# Patient Record
Sex: MALE | Race: WHITE | NOT HISPANIC OR LATINO | Employment: FULL TIME | URBAN - METROPOLITAN AREA
[De-identification: names, ages, dates, MRNs, and addresses within clinical notes are randomized per-mention and may not be internally consistent; named-entity substitution may affect disease eponyms.]

---

## 2024-10-15 ENCOUNTER — OFFICE VISIT (OUTPATIENT)
Dept: INTERNAL MEDICINE | Age: 49
End: 2024-10-15
Payer: COMMERCIAL

## 2024-10-15 VITALS
OXYGEN SATURATION: 99 % | BODY MASS INDEX: 28.11 KG/M2 | HEIGHT: 78 IN | WEIGHT: 243 LBS | TEMPERATURE: 98.4 F | HEART RATE: 78 BPM | DIASTOLIC BLOOD PRESSURE: 78 MMHG | SYSTOLIC BLOOD PRESSURE: 102 MMHG

## 2024-10-15 DIAGNOSIS — Z12.5 SCREENING FOR PROSTATE CANCER: ICD-10-CM

## 2024-10-15 DIAGNOSIS — E78.00 PURE HYPERCHOLESTEROLEMIA: ICD-10-CM

## 2024-10-15 DIAGNOSIS — R73.03 PREDIABETES: ICD-10-CM

## 2024-10-15 DIAGNOSIS — F84.5: ICD-10-CM

## 2024-10-15 DIAGNOSIS — F32.9 REACTIVE DEPRESSION: ICD-10-CM

## 2024-10-15 DIAGNOSIS — Z23 NEED FOR VACCINATION: ICD-10-CM

## 2024-10-15 DIAGNOSIS — N52.9 ERECTILE DISORDER: Primary | ICD-10-CM

## 2024-10-15 LAB — PSA SERPL-MCNC: 1.28 NG/ML (ref 0–4)

## 2024-10-15 PROCEDURE — 84402 ASSAY OF FREE TESTOSTERONE: CPT | Performed by: INTERNAL MEDICINE

## 2024-10-15 PROCEDURE — 90471 IMMUNIZATION ADMIN: CPT | Performed by: INTERNAL MEDICINE

## 2024-10-15 PROCEDURE — 99204 OFFICE O/P NEW MOD 45 MIN: CPT | Performed by: INTERNAL MEDICINE

## 2024-10-15 PROCEDURE — G0103 PSA SCREENING: HCPCS | Performed by: INTERNAL MEDICINE

## 2024-10-15 PROCEDURE — 84403 ASSAY OF TOTAL TESTOSTERONE: CPT | Performed by: INTERNAL MEDICINE

## 2024-10-15 PROCEDURE — 90715 TDAP VACCINE 7 YRS/> IM: CPT | Performed by: INTERNAL MEDICINE

## 2024-10-15 RX ORDER — SIMVASTATIN 40 MG
40 TABLET ORAL NIGHTLY
COMMUNITY

## 2024-10-15 RX ORDER — TADALAFIL 10 MG/1
TABLET ORAL
Qty: 30 TABLET | Refills: 0 | Status: SHIPPED | OUTPATIENT
Start: 2024-10-15

## 2024-10-15 RX ORDER — ESCITALOPRAM OXALATE 20 MG/1
20 TABLET ORAL DAILY
COMMUNITY

## 2024-10-15 NOTE — PROGRESS NOTES
CHIEF COMPLAINT  Mark Benitez Jr. presents to [facility] MEDIC[facility] to Establish Care (Patient just moved here and needing a PCP.), patient is having problems mantaining a  (Erection and would like to see about getting medication for this like cialias), patient would like to see about winging off of lexapro  (And is there any sexual side effects from it.), and Hyperlipidemia     HPI  49-year-old patient here to establish care-just moved from [place], [place]-     Pt just got  1 1/2 weeks ago  Main concern is ED    Reviewed labs from previous PCP-Labs drawn 7/24/2024 with total cholesterol 160, LDL 90 HDL 47  ( vitamin D 12 in 7/25/2023), TSH equals 2.37 and normal on 7/20/2024, MND equals 35 on 7/24/2020, 12 equals 530 on 7/20/2024, comp with normal liver function test creatinine equals 1.2 glucose 96 ALT equals 24 potassium 4.3 total bilirubin 0.5 alk phos 75 UN 23 3141, A1c equals 6 on 7/20/2024, WBC 5.5 hemoglobin 14 lites 223 hematocrit 43 on 7/20/2024      PMH-ED, depression-on lexapro for 21 yrs, h/o OCD/anxiety, cholesterol, Aspergers  SH- Had lived with elderly parents-works in [employer] and a  in [employer] daily- [employer] steps-  -no cigs,ETOH or drugs    ROS -no cp-no soa, anxious, no HI no SI, decreased erection    Past History:  Allergies: Patient has no known allergies.   Medical History: has a past medical history of Anxiety and Depression.   Surgical History: has no past surgical history on file.   Family History: family history includes COPD in his father; Heart disease in his paternal grandmother.   Social History: reports that he has never smoked. He has never used smokeless tobacco. He reports that he does not drink alcohol and does not use drugs.  Social History     Social History Narrative    Not on file       Current Outpatient Medications:     escitalopram (LEXAPRO) 20 MG tablet, Take 1 tablet by mouth Daily., Disp: , Rfl:  "    simvastatin (ZOCOR) 40 MG tablet, Take 1 tablet by mouth Every Night., Disp: , Rfl:     tadalafil (Cialis) 10 MG tablet, One tab po before activity -can increase to 2 tabs po every 36  hrs as needed, Disp: 30 tablet, Rfl: 0     OBJECTIVE  Vital Signs  Vitals:    10/15/24 0818   BP: 102/78   BP Location: Left arm   Patient Position: Sitting   Cuff Size: Adult   Pulse: 78   Temp: 98.4 °F (36.9 °C)   SpO2: 99%   Weight: 110 kg (243 lb)   Height: 198.1 cm (78\")      Body mass index is 28.08 kg/m².      Physical Exam  Vitals and nursing note reviewed.   Constitutional:       Appearance: Normal appearance.   HENT:      Head: Normocephalic and atraumatic.      Nose: Nose normal.   Eyes:      Extraocular Movements: Extraocular movements intact.      Pupils: Pupils are equal, round, and reactive to light.   Cardiovascular:      Rate and Rhythm: Normal rate and regular rhythm.   Pulmonary:      Effort: Pulmonary effort is normal.      Breath sounds: Normal breath sounds.   Abdominal:      General: Abdomen is flat.      Palpations: Abdomen is soft.   Musculoskeletal:      Cervical back: Normal range of motion and neck supple.   Skin:     General: Skin is warm and dry.   Neurological:      General: No focal deficit present.      Mental Status: He is alert and oriented to person, place, and time.   Psychiatric:         Mood and Affect: Mood normal.         Behavior: Behavior normal.       RESULTS REVIEW  No results found for: \"PROBNP\", \"BNP\"          No results found for: \"TSH\"   No results found for: \"FREET4\"         No Images in the past 120 days found..              ASSESSMENT & PLAN  Diagnoses and all orders for this visit:    1. Erectile disorder (Primary)  Comments:  Decreased ercetion past 2-3 yrs-cbc, cmp, TSH, B12, Vit D -wnl in 7/2024--from previous PCP-Check Testosterone  Assessment & Plan:  Patient with decreased erection for 3 to 5 years on Lexapro for the past 21 years.  Etiology may be multifactorial from " Lexapro/depression versus other etiology    Plan-decrease Lexapro to 10 mg daily no HI or SI  Check testosterone levels and PSA  Most likely will need urology consult also to further evaluate ED and alternative treatments  Patient recently  the past 1-1/2 weeks-will give Cialis 10 mg 1 to 2 tablets every 36 hours to activity-side effects discussed patient is active and walks daily without any chest pain or shortness of air-not on any nitrates  Follow-up in 4 to 5 weeks to check on Cialis and Lexapro doses    Orders:  -     Testosterone, Free, Total; Future  -     tadalafil (Cialis) 10 MG tablet; One tab po before activity -can increase to 2 tabs po every 36  hrs as needed  Dispense: 30 tablet; Refill: 0  -     PSA SCREENING; Future    2. Pure hypercholesterolemia  Comments:  Stable lipids LDL less than 6/1/1930 on statin-no myalgias    3. Reactive depression  Comments:  No HI or SI on Lexapro for the past 21 years-plan-will decrease Lexapro to 10 mg due to ED and lower as tolerated/may need behavioral health consult in future    4. Prediabetes  Comments:  A1c-6 -follow low carb diet    5. Screening for prostate cancer  -     PSA SCREENING; Future    6. Need for vaccination  -     Tdap Vaccine => 6yo IM (BOOSTRIX/ADACEL)    7. Asperger disorder         BMI is >= 25 and <30. (Overweight) The following options were offered after discussion;: weight loss educational material (shared in after visit summary), exercise counseling/recommendations, and nutrition counseling/recommendations    PLAN-10/15/2024  Patient Instructions   Decrease escitalopram 20 mg to 1/2 tablet daily  Check testosterone total and free levels today, and PSA  Start cialis 10 mg initially can increase to 20 mg every 36 hours or as needed  May need urology consult once labs back    Due for flu shot and tetanus vaccine-wants Tdap today   Commend COVID-vaccine at pharmacy     Addendum 10/21/2024-  low free testosterone 5.8 and normal testosterone  levels  High likelihood may be testosterone deficient will refer to urologist  FOLLOW UP  Return in about 5 weeks (around 11/19/2024) for Annual physical, Recheck.    Patient was given instructions and counseling regarding his condition or for health maintenance advice. Please see specific information pulled into the AVS if appropriate.

## 2024-10-15 NOTE — ASSESSMENT & PLAN NOTE
Patient with decreased erection for 3 to 5 years on Lexapro for the past 21 years.  Etiology may be multifactorial from Lexapro/depression versus other etiology    Plan-decrease Lexapro to 10 mg daily no HI or SI  Check testosterone levels and PSA  Most likely will need urology consult also to further evaluate ED and alternative treatments  Patient recently  the past 1-1/2 weeks-will give Cialis 10 mg 1 to 2 tablets every 36 hours to activity-side effects discussed patient is active and walks daily without any chest pain or shortness of air-not on any nitrates  Follow-up in 4 to 5 weeks to check on Cialis and Lexapro doses

## 2024-10-15 NOTE — PATIENT INSTRUCTIONS
Decrease escitalopram 20 mg to 1/2 tablet daily  Check testosterone total and free levels today, and PSA  Start cialis 10 mg initially can increase to 20 mg every 36 hours or as needed  May need urology consult once labs back    Due for flu shot and tetanus vaccine-wants Tdap today   Commend COVID-vaccine at pharmacy

## 2024-10-19 LAB
TESTOST FREE SERPL-MCNC: 5.7 PG/ML (ref 6.8–21.5)
TESTOST SERPL-MCNC: 398 NG/DL (ref 264–916)

## 2024-11-20 NOTE — PROGRESS NOTES
"CHIEF COMPLAINT  Mark Benitez Jr. presents to [facility] for follow-up of Annual Exam, Primary Care Follow-Up (5 week follow up ), and Labs Only (Done after last visit.    Patient has appointment with urologist appointment on December 2.).    HPI  Here with wife-newly   49-year-old patient with depression, HLD, ED among others here for physical and follow-up    Records reviewed, meds reviewed in detail, labs reviewed with patient.  Plan of care is as follows below.  ED[facility]cialis helping    [facility]  ROS-depression no myalgias      Current Outpatient Medications:     tadalafil (Cialis) 10 MG tablet, One tab po before activity -can increase to 2 tabs po every 36  hrs as needed, Disp: 30 tablet, Rfl: 0    escitalopram (LEXAPRO) 20 MG tablet, Take 1 tablet by mouth Daily. 1/2 tablet daily, Disp: , Rfl:     simvastatin (ZOCOR) 40 MG tablet, Take 1 tablet by mouth Every Night. 1/2 tablet daily, Disp: , Rfl:    PFSH reviewed.      OBJECTIVE  Vital Signs  Vitals:    11/21/24 0848   BP: 122/78   BP Location: Left arm   Patient Position: Sitting   Cuff Size: Small Adult   Pulse: 76   Temp: 97.4 °F (36.3 °C)   SpO2: 98%   Weight: 112 kg (246 lb 6.4 oz)   Height: 198.1 cm (78\")      Body mass index is 28.47 kg/m².    Physical Exam  Vitals and nursing note reviewed.   Constitutional:       Appearance: Normal appearance.   HENT:      Head: Normocephalic and atraumatic.      Left Ear: Tympanic membrane normal.      Ears:      Comments: Right with cerumen       Nose: Nose normal.   Eyes:      Extraocular Movements: Extraocular movements intact.      Pupils: Pupils are equal, round, and reactive to light.   Cardiovascular:      Rate and Rhythm: Normal rate and regular rhythm.   Pulmonary:      Effort: Pulmonary effort is normal.      Breath sounds: Normal breath sounds.   Abdominal:      General: Abdomen is flat.      Palpations: Abdomen is soft.   Musculoskeletal:      Cervical back: " "Normal range of motion and neck supple.   Skin:     General: Skin is warm and dry.   Neurological:      General: No focal deficit present.      Mental Status: He is alert and oriented to person, place, and time.   Psychiatric:         Mood and Affect: Mood normal.         Behavior: Behavior normal.          RESULTS REVIEW  No results found for: \"PROBNP\", \"BNP\"          No results found for: \"TSH\"   No results found for: \"FREET4\"      No results found for: \"ZROENOUT85\", \"QIFY32UW\", \"MG\"  PSA          10/15/2024    09:41   PSA   PSA 1.280       No Images in the past 120 days found..             ASSESSMENT & PLAN  Diagnoses and all orders for this visit:    1. Routine history and physical examination of adult (Primary)  Assessment & Plan:  Ages 40-64 Counseling/Anticipatory Guidance Discussed: nutrition, physical activity, healthy weight, injury prevention, misuse of tobacco, alcohol and drugs, contraception, dental health, mental health, immunizations, screenings, and use of seatbelt    Orders:  -     Comprehensive Metabolic Panel; Future  -     Lipid Panel; Future  -     TSH+Free T4; Future  -     T3, Free; Future  -     Vitamin B12; Future  -     Folate; Future  -     Magnesium; Future  -     Vitamin D,25-Hydroxy; Future  -     CBC & Differential; Future  -     MicroAlbumin, Urine, Random - Urine, Clean Catch; Future  -     Hepatitis C Antibody; Future    2. Pure hypercholesterolemia  Assessment & Plan:  Lipids stable with simvastatin- LDL 90 HDL 47  in July 2024 with his previous PCP no myalgias    Plan continue simvastatin 40 mg daily  Check lipids in 3 to 4 months.    Orders:  -     Comprehensive Metabolic Panel; Future  -     Lipid Panel; Future  -     TSH+Free T4; Future  -     T3, Free; Future  -     Vitamin B12; Future  -     Folate; Future  -     Magnesium; Future  -     Vitamin D,25-Hydroxy; Future  -     CBC & Differential; Future  -     MicroAlbumin, Urine, Random - Urine, Clean Catch; " Future  -     Hepatitis C Antibody; Future    3. Reactive depression  Assessment & Plan:  Stable with Lexapro 10 mg daily patient also with history of OCD and anxiety but patient wants to see if he still needs the Lexapro since he has been on this for over 20 years.  Also recently  and states he has made adjustments regarding his OCD and anxiety    Plan-Taper Lexapro as discussed.  Continue with Lexapro 10 mg daily for total of 4 weeks then can take every other day for 2 weeks then stop.    Orders:  -     Comprehensive Metabolic Panel; Future  -     Lipid Panel; Future  -     TSH+Free T4; Future  -     T3, Free; Future  -     Vitamin B12; Future  -     Folate; Future  -     Magnesium; Future  -     Vitamin D,25-Hydroxy; Future  -     CBC & Differential; Future  -     MicroAlbumin, Urine, Random - Urine, Clean Catch; Future  -     Hepatitis C Antibody; Future    4. Asperger disorder  -     Comprehensive Metabolic Panel; Future  -     Lipid Panel; Future  -     TSH+Free T4; Future  -     T3, Free; Future  -     Vitamin B12; Future  -     Folate; Future  -     Magnesium; Future  -     Vitamin D,25-Hydroxy; Future  -     CBC & Differential; Future  -     MicroAlbumin, Urine, Random - Urine, Clean Catch; Future  -     Hepatitis C Antibody; Future    5. Erectile disorder  Comments:  Has appointment with Dr. Elder urology December 2024  Assessment & Plan:  Patient with decreased erection for 3 to 5 years on Lexapro for the past 21 years.  Etiology may be multifactorial from Lexapro/depression versus other etiology    Plan-currently on Lexapro to 10 mg daily no HI or SI  Has low free testosterone normal testosterone levels  Cialis 10 mg is helping but feels like the 20 mg would be better  Keep urology appointment because of low free testosterone-appoint with Dr. Elder December 2, 2024  To get a try to taper off Lexapro as directed    Orders:  -     Comprehensive Metabolic Panel; Future  -     Lipid Panel; Future  -      TSH+Free T4; Future  -     T3, Free; Future  -     Vitamin B12; Future  -     Folate; Future  -     Magnesium; Future  -     Vitamin D,25-Hydroxy; Future  -     CBC & Differential; Future  -     MicroAlbumin, Urine, Random - Urine, Clean Catch; Future  -     Hepatitis C Antibody; Future    6. Prediabetes  Assessment & Plan:  A1c equals 6 July 2024    Plan follow a low-carb diet recheck A1c at next visit    Orders:  -     Comprehensive Metabolic Panel; Future  -     Lipid Panel; Future  -     TSH+Free T4; Future  -     T3, Free; Future  -     Vitamin B12; Future  -     Folate; Future  -     Magnesium; Future  -     Vitamin D,25-Hydroxy; Future  -     CBC & Differential; Future  -     MicroAlbumin, Urine, Random - Urine, Clean Catch; Future  -     Hepatitis C Antibody; Future  -     Hemoglobin A1c; Future    7. Need for influenza vaccination  -     Fluzone >6mos (2030-5283)  -     Comprehensive Metabolic Panel; Future  -     Lipid Panel; Future  -     TSH+Free T4; Future  -     T3, Free; Future  -     Vitamin B12; Future  -     Folate; Future  -     Magnesium; Future  -     Vitamin D,25-Hydroxy; Future  -     CBC & Differential; Future  -     MicroAlbumin, Urine, Random - Urine, Clean Catch; Future  -     Hepatitis C Antibody; Future    8. Need for hepatitis C screening test  -     Comprehensive Metabolic Panel; Future  -     Lipid Panel; Future  -     TSH+Free T4; Future  -     T3, Free; Future  -     Vitamin B12; Future  -     Folate; Future  -     Magnesium; Future  -     Vitamin D,25-Hydroxy; Future  -     CBC & Differential; Future  -     MicroAlbumin, Urine, Random - Urine, Clean Catch; Future  -     Hepatitis C Antibody; Future     Plan 11/21/24  Patient Instructions   Cont meds  Lexapro 10 mg daily for 2 weeks -pt wants to taper and can take every other day for 2 weeks  Debrox ear drops to right ear-can return to clinic for ear irrigation  F/uw ith Dr Francis 12/2/24  F/u 4 months with labs prior     Cologuard  2022-neg   Flu shot today          FOLLOW UP  Return in about 4 months (around 3/31/2025) for Recheck. With labs prior    Patient was given instructions and counseling regarding his condition or for health maintenance advice. Please see specific information pulled into the AVS if appropriate.     Older Notes  10/15/2024  Patient Instructions   Decrease escitalopram 20 mg to 1/2 tablet daily  Check testosterone total and free levels today, and PSA  Start cialis 10 mg initially can increase to 20 mg every 36 hours or as needed  May need urology consult once labs back     Due for flu shot and tetanus vaccine-wants Tdap today   Commend COVID-vaccine at pharmacy     Addendum 10/21/2024-  low free testosterone 5.8 and normal testosterone levels  High likelihood may be testosterone deficient will refer to urologist  10/15/2024 visit    patient here to establish care-just moved from [place], [place]-      Pt just got  1 1/2 weeks ago  Main concern is ED     Reviewed labs from previous PCP-Labs drawn 7/24/2024 with total cholesterol 160, LDL 90 HDL 47  ( vitamin D 12 in 7/25/2023), TSH equals 2.37 and normal on 7/20/2024, MND equals 35 on 7/24/2020, 12 equals 530 on 7/20/2024, comp with normal liver function test creatinine equals 1.2 glucose 96 ALT equals 24 potassium 4.3 total bilirubin 0.5 alk phos 75 UN 23 3141, A1c equals 6 on 7/20/2024, WBC 5.5 hemoglobin 14 lites 223 hematocrit 43 on 7/20/2024        PMH-ED, depression-on lexapro for 21 yrs, h/o OCD/anxiety, cholesterol, Aspergers  SH- Had lived with elderly parents-works in [employer] and a  in [employer]-walks daily- 4500 steps-  -no cigs,ETOH or drugs  Wife is office mgr- works at [employer] with Dr [employer], [employer]

## 2024-11-21 ENCOUNTER — OFFICE VISIT (OUTPATIENT)
Dept: INTERNAL MEDICINE | Age: 49
End: 2024-11-21
Payer: COMMERCIAL

## 2024-11-21 VITALS
BODY MASS INDEX: 28.51 KG/M2 | HEIGHT: 78 IN | HEART RATE: 76 BPM | WEIGHT: 246.4 LBS | TEMPERATURE: 97.4 F | DIASTOLIC BLOOD PRESSURE: 78 MMHG | OXYGEN SATURATION: 98 % | SYSTOLIC BLOOD PRESSURE: 122 MMHG

## 2024-11-21 DIAGNOSIS — Z11.59 NEED FOR HEPATITIS C SCREENING TEST: ICD-10-CM

## 2024-11-21 DIAGNOSIS — F32.9 REACTIVE DEPRESSION: ICD-10-CM

## 2024-11-21 DIAGNOSIS — R73.03 PREDIABETES: ICD-10-CM

## 2024-11-21 DIAGNOSIS — E78.00 PURE HYPERCHOLESTEROLEMIA: ICD-10-CM

## 2024-11-21 DIAGNOSIS — N52.9 ERECTILE DISORDER: ICD-10-CM

## 2024-11-21 DIAGNOSIS — Z00.00 ROUTINE HISTORY AND PHYSICAL EXAMINATION OF ADULT: Primary | ICD-10-CM

## 2024-11-21 DIAGNOSIS — F84.5: ICD-10-CM

## 2024-11-21 DIAGNOSIS — Z23 NEED FOR INFLUENZA VACCINATION: ICD-10-CM

## 2024-11-21 NOTE — ASSESSMENT & PLAN NOTE
Stable with Lexapro 10 mg daily patient also with history of OCD and anxiety but patient wants to see if he still needs the Lexapro since he has been on this for over 20 years.  Also recently  and states he has made adjustments regarding his OCD and anxiety    Plan-Taper Lexapro as discussed.  Continue with Lexapro 10 mg daily for total of 4 weeks then can take every other day for 2 weeks then stop.

## 2024-11-21 NOTE — ASSESSMENT & PLAN NOTE
Patient with decreased erection for 3 to 5 years on Lexapro for the past 21 years.  Etiology may be multifactorial from Lexapro/depression versus other etiology    Plan-currently on Lexapro to 10 mg daily no HI or SI  Has low free testosterone normal testosterone levels  Cialis 10 mg is helping but feels like the 20 mg would be better  Keep urology appointment because of low free testosterone-appoint with Dr. Elder December 2, 2024  To get a try to taper off Lexapro as directed

## 2024-11-21 NOTE — ASSESSMENT & PLAN NOTE
Lipids stable with simvastatin- LDL 90 HDL 47  in July 2024 with his previous PCP no myalgias    Plan continue simvastatin 40 mg daily  Check lipids in 3 to 4 months.

## 2024-11-21 NOTE — PATIENT INSTRUCTIONS
Cont meds  Lexapro 10 mg daily for 2 weeks -pt wants to taper and can take every other day for 2 weeks  Debrox ear drops to right ear-can return to clinic for ear irrigation  F/uw ith Dr Francis 12/2/24  F/u 4 months with labs prior     Cologuard 2022-neg   Flu shot today

## 2024-11-21 NOTE — ASSESSMENT & PLAN NOTE
Ages 40-64 Counseling/Anticipatory Guidance Discussed: nutrition, physical activity, healthy weight, injury prevention, misuse of tobacco, alcohol and drugs, contraception, dental health, mental health, immunizations, screenings, and use of seatbelt

## 2024-12-02 ENCOUNTER — OFFICE VISIT (OUTPATIENT)
Dept: UROLOGY | Facility: CLINIC | Age: 49
End: 2024-12-02
Payer: COMMERCIAL

## 2024-12-02 VITALS
DIASTOLIC BLOOD PRESSURE: 61 MMHG | WEIGHT: 252 LBS | SYSTOLIC BLOOD PRESSURE: 108 MMHG | HEIGHT: 78 IN | HEART RATE: 82 BPM | BODY MASS INDEX: 29.16 KG/M2 | TEMPERATURE: 98.6 F

## 2024-12-02 DIAGNOSIS — N52.9 ERECTILE DYSFUNCTION, UNSPECIFIED ERECTILE DYSFUNCTION TYPE: Primary | ICD-10-CM

## 2024-12-02 DIAGNOSIS — E29.1 HYPOGONADISM IN MALE: ICD-10-CM

## 2024-12-02 LAB
BILIRUB BLD-MCNC: NEGATIVE MG/DL
CLARITY, POC: CLEAR
COLOR UR: YELLOW
EXPIRATION DATE: NORMAL
GLUCOSE UR STRIP-MCNC: NEGATIVE MG/DL
KETONES UR QL: NEGATIVE
LEUKOCYTE EST, POC: NEGATIVE
Lab: NORMAL
NITRITE UR-MCNC: NEGATIVE MG/ML
PH UR: 5.5 [PH] (ref 5–8)
PROT UR STRIP-MCNC: NEGATIVE MG/DL
RBC # UR STRIP: NEGATIVE /UL
SP GR UR: 1.03 (ref 1–1.03)
UROBILINOGEN UR QL: NORMAL

## 2024-12-02 RX ORDER — TADALAFIL 20 MG/1
20 TABLET ORAL 3 TIMES WEEKLY
Qty: 20 TABLET | Refills: 4 | Status: SHIPPED | OUTPATIENT
Start: 2024-12-02 | End: 2025-04-01

## 2024-12-02 NOTE — PROGRESS NOTES
"Chief Complaint  Erectile Dysfunction and NEW PATIENT  Hypogonadism  Subjective no acute distress        Mark Benitez Jr. presents to [facility]  History of Present Illness    49-year-old white male is newly  and could not perform sexually.  Patient was given tadalafil 20 mg which worked for him.  His free testosterone level is lower than normal and patient is concerned about that.  His total testosterone is 398 ng per DL and free testosterone is 5.7 pg/mL.  Patient wants to know more about this testosterone level and its function.    The couple is not interested in having babies because of their age.    Objective no acute distress  Vital Signs:   /61 (BP Location: Left arm, Patient Position: Sitting, Cuff Size: Large Adult)   Pulse 82   Temp 98.6 °F (37 °C) (Temporal)   Ht 198.1 cm (77.99\")   Wt 114 kg (252 lb)   BMI 29.13 kg/m²     No Known Allergies   Past medical history:  has a past medical history of Anxiety, Depression, Erectile disorder, and Kidney stone.   Past surgical history:  has no past surgical history on file.  Personal history: family history includes COPD in his father; Diabetes in his maternal grandfather, paternal grandmother, and paternal uncle; Heart disease in his paternal grandmother.  Social history:  reports that he has never smoked. He has never been exposed to tobacco smoke. He has never used smokeless tobacco. He reports that he does not drink alcohol and does not use drugs.    Review of Systems    Please see past medical and surgical history    Physical Exam  Constitutional:       General: He is not in acute distress.     Appearance: Normal appearance. He is normal weight. He is not ill-appearing or toxic-appearing.   HENT:      Head: Normocephalic and atraumatic.      Ears:      Comments: No loss of hearing  Abdominal:      Palpations: Abdomen is soft. There is no mass.      Tenderness: There is no abdominal tenderness. There is no right CVA " tenderness or left CVA tenderness.   Genitourinary:     Comments: Penis is circumcised and is normal.  Right and left scrotum is normal.    Left testicle is normal.  There is induration of left epididymis.    Right testicle is normal.  Some induration and small cystic area which is very tiny in the lower pole right epididymis  Musculoskeletal:         General: Normal range of motion.   Skin:     General: Skin is warm.      Coloration: Skin is not jaundiced.   Neurological:      General: No focal deficit present.      Mental Status: He is alert and oriented to person, place, and time.      Motor: No weakness.      Gait: Gait normal.   Psychiatric:         Mood and Affect: Mood normal.         Behavior: Behavior normal.         Thought Content: Thought content normal.         Judgment: Judgment normal.        Result Review :                 Assessment and Plan    Diagnoses and all orders for this visit:    1. Erectile dysfunction, unspecified erectile dysfunction type (Primary)  -     POC Urinalysis Dipstick, Automated  -     tadalafil (CIALIS) 20 MG tablet; Take 1 tablet by mouth 3 (Three) Times a Week for 120 days.  Dispense: 20 tablet; Refill: 4  -     Follicle Stimulating Hormone; Future  -     Luteinizing Hormone; Future  -     Prolactin; Future  -     Testosterone (Free & Total), LC / MS; Future  -     Sex Horm Binding Globulin; Future  -     Estradiol, Free Serum; Future    2. Hypogonadism in male  -     tadalafil (CIALIS) 20 MG tablet; Take 1 tablet by mouth 3 (Three) Times a Week for 120 days.  Dispense: 20 tablet; Refill: 4  -     Follicle Stimulating Hormone; Future  -     Luteinizing Hormone; Future  -     Prolactin; Future  -     Testosterone (Free & Total), LC / MS; Future  -     Sex Horm Binding Globulin; Future  -     Estradiol, Free Serum; Future    I am going to do testosterone panel on the patient and refer the patient to Dr. Mckenzie for further care.    Prescription of tadalafil 20 mg is given  which she can use every other day if he wants to for sexual intercourse.     Brief Urine Lab Results  (Last result in the past 365 days)        Color   Clarity   Blood   Leuk Est   Nitrite   Protein   CREAT   Urine HCG        12/02/24 1428 Yellow   Clear   Negative   Negative   Negative   Negative                    Follow Up   No follow-ups on file.  Patient was given instructions and counseling regarding his condition or for health maintenance advice. Please see specific information pulled into the AVS if appropriate.     Prashanth Elder MD

## 2024-12-11 ENCOUNTER — LAB (OUTPATIENT)
Facility: HOSPITAL | Age: 49
End: 2024-12-11
Payer: COMMERCIAL

## 2024-12-11 DIAGNOSIS — N52.9 ERECTILE DYSFUNCTION, UNSPECIFIED ERECTILE DYSFUNCTION TYPE: ICD-10-CM

## 2024-12-11 DIAGNOSIS — E29.1 HYPOGONADISM IN MALE: ICD-10-CM

## 2024-12-11 LAB
FSH SERPL-ACNC: 2.29 MIU/ML
LH SERPL-ACNC: 5.23 MIU/ML
PROLACTIN SERPL-MCNC: 6.81 NG/ML (ref 4.04–15.2)

## 2024-12-11 PROCEDURE — 82670 ASSAY OF TOTAL ESTRADIOL: CPT

## 2024-12-11 PROCEDURE — 36415 COLL VENOUS BLD VENIPUNCTURE: CPT

## 2024-12-11 PROCEDURE — 84146 ASSAY OF PROLACTIN: CPT

## 2024-12-11 PROCEDURE — 84402 ASSAY OF FREE TESTOSTERONE: CPT

## 2024-12-11 PROCEDURE — 83001 ASSAY OF GONADOTROPIN (FSH): CPT

## 2024-12-11 PROCEDURE — 84403 ASSAY OF TOTAL TESTOSTERONE: CPT

## 2024-12-11 PROCEDURE — 84270 ASSAY OF SEX HORMONE GLOBUL: CPT

## 2024-12-11 PROCEDURE — 83002 ASSAY OF GONADOTROPIN (LH): CPT

## 2024-12-11 PROCEDURE — 82681 ASSAY DIR MEAS FR ESTRADIOL: CPT

## 2024-12-12 LAB — SHBG SERPL-SCNC: 29.5 NMOL/L (ref 16.5–55.9)

## 2024-12-15 LAB
TESTOST FREE SERPL-MCNC: 4.4 PG/ML (ref 6.8–21.5)
TESTOST SERPL-MCNC: 397.3 NG/DL (ref 264–916)

## 2024-12-16 ENCOUNTER — TELEPHONE (OUTPATIENT)
Dept: UROLOGY | Facility: CLINIC | Age: 49
End: 2024-12-16
Payer: COMMERCIAL

## 2024-12-16 NOTE — TELEPHONE ENCOUNTER
I called the patient and informed him that his total testosterone is 397.3 ng/dL and free testosterone is 4.4 pg/mL.  He should be on testosterone replacement therapy which should help him in general and also his heart.

## 2024-12-17 ENCOUNTER — TELEPHONE (OUTPATIENT)
Dept: INTERNAL MEDICINE | Age: 49
End: 2024-12-17
Payer: COMMERCIAL

## 2024-12-17 NOTE — TELEPHONE ENCOUNTER
Spoke w/pt he stated he went for blood work on 12/11 and passed out for 5-6 sec while getting blood drawn. He stated they blew a vein in the bend of his right arm at elbow and there is now a bruise. Pt states since this happened he has been experiencing fatigue/SOA and gets winded walking across the room or up stairs. Also experiences fatigue throughout the day.    Pt was seen in  today (12/17) and tested neg for COVID/Flu.  advised f/u w/ pcp and labs.    Pt was advised to go to ED if SOA worsens. Pt voiced understanding.    Scheduled appt w/  for 12/18 at 8:00 AM

## 2024-12-18 ENCOUNTER — OFFICE VISIT (OUTPATIENT)
Dept: INTERNAL MEDICINE | Age: 49
End: 2024-12-18
Payer: COMMERCIAL

## 2024-12-18 VITALS
DIASTOLIC BLOOD PRESSURE: 60 MMHG | WEIGHT: 236 LBS | OXYGEN SATURATION: 96 % | SYSTOLIC BLOOD PRESSURE: 92 MMHG | HEART RATE: 112 BPM | HEIGHT: 78 IN | BODY MASS INDEX: 27.31 KG/M2 | TEMPERATURE: 97.6 F

## 2024-12-18 DIAGNOSIS — R00.0 TACHYCARDIA: ICD-10-CM

## 2024-12-18 DIAGNOSIS — I95.1 ORTHOSTATIC HYPOTENSION: Primary | ICD-10-CM

## 2024-12-18 DIAGNOSIS — R06.02 SHORTNESS OF BREATH: ICD-10-CM

## 2024-12-18 PROCEDURE — 93000 ELECTROCARDIOGRAM COMPLETE: CPT | Performed by: INTERNAL MEDICINE

## 2024-12-18 PROCEDURE — 99213 OFFICE O/P EST LOW 20 MIN: CPT | Performed by: INTERNAL MEDICINE

## 2024-12-18 NOTE — PATIENT INSTRUCTIONS
Patient with tachycardia and shortness of air with exertion-with drop in blood pressure on standing at clinic-had several vials of blood removed 1 week ago and has felt worse since then     Patient may be dehydrated-blood pressure 92/60 on standing at clinic-90/70 on standing with pulse rate of 118 EKG also with tachycardia.  Patient states is urinating every  3 to 4 hours     Advised patient to go to the emergency room for further evaluation of tachycardia shortness of air and hypotension-and IVF.

## 2024-12-18 NOTE — PROGRESS NOTES
"Blood pressure 92/60 on's CHIEF COMPLAINT  Mark Benitez Jr. presents to [facility] for follow-up of Dizziness, Shortness of Breath (Patient had blood drawn and a student did it and blown the vein has not been right since.  Patient went to urgent care yesterday they did a chest x ray covid flu .), Fatigue, patient has had anemia in the past, and Palpitations.    HPI  49-year-old patient with complaints of dizziness and shortness of air since 1 week ago after got blood work drawn-pt passed put about 5 seconds.  Seen at urgent clinic yesterday for the same complaints with negative chest x-ray COVID and flu.  C/o fast HR on exertion and goes away on lying down--x past 4 days-several times a day-  C/o soa with exertion/walking worse past 3 days-    Records reviewed, meds reviewed in detail, labs reviewed with patient.  Plan of care is as follows below.    PMFSH-Reviewed  ROS-dizziness shortness of air fatigue, fast HR      Current Outpatient Medications:     escitalopram (LEXAPRO) 20 MG tablet, Take 1 tablet by mouth Daily. 1/2 tablet daily, Disp: , Rfl:     ferrous sulfate 325 (65 Fe) MG tablet, Take 1 tablet by mouth., Disp: , Rfl:     simvastatin (ZOCOR) 40 MG tablet, Take 1 tablet by mouth Every Night. 1/2 tablet daily, Disp: , Rfl:     tadalafil (CIALIS) 20 MG tablet, Take 1 tablet by mouth 3 (Three) Times a Week for 120 days., Disp: 20 tablet, Rfl: 4   PFSH reviewed.      OBJECTIVE  Vital Signs  Vitals:    12/18/24 0808 12/18/24 0842 12/18/24 0844 12/18/24 0848   BP: 95/62 98/70 92/60    BP Location: Left arm Left arm Left arm    Patient Position: Sitting Sitting Standing    Cuff Size: Small Adult Adult Adult    Pulse: 118   112   Temp: 97.6 °F (36.4 °C)      SpO2: 96%      Weight: 107 kg (236 lb)      Height: 198.1 cm (78\")         Body mass index is 27.27 kg/m².    Physical Exam  Vitals and nursing note reviewed.   Constitutional:       Appearance: Normal appearance.   HENT:     " " Head: Normocephalic and atraumatic.      Nose: Nose normal.   Eyes:      Extraocular Movements: Extraocular movements intact.      Pupils: Pupils are equal, round, and reactive to light.   Cardiovascular:      Rate and Rhythm: Normal rate and regular rhythm.   Pulmonary:      Effort: Pulmonary effort is normal.      Breath sounds: Normal breath sounds.   Abdominal:      General: Abdomen is flat.      Palpations: Abdomen is soft.   Musculoskeletal:      Cervical back: Normal range of motion and neck supple.   Skin:     General: Skin is warm and dry.   Neurological:      General: No focal deficit present.      Mental Status: He is alert and oriented to person, place, and time.   Psychiatric:         Mood and Affect: Mood normal.         Behavior: Behavior normal.          RESULTS REVIEW  No results found for: \"PROBNP\", \"BNP\"          No results found for: \"TSH\"   No results found for: \"FREET4\"      No results found for: \"LUQQTZNP06\", \"LWBO03XI\", \"MG\"  PSA          10/15/2024    09:41   PSA   PSA 1.280       XR Chest 2 View    Result Date: 12/17/2024  Impression: No acute process. Electronically Signed: Melody Cruz MD  12/17/2024 9:32 AM EST  Workstation ID: [id number]          ECG 12 Lead    Date/Time: 12/18/2024 9:02 AM  Performed by: Romy Rueda MD    Authorized by: Romy Rueda MD  Comparison: not compared with previous ECG   Rhythm: sinus tachycardia  BPM: 109            ASSESSMENT & PLAN  Diagnoses and all orders for this visit:    1. hypotension (Primary)  Assessment & Plan:  Patient with tachycardia and shortness of air with exertion-with drop in blood pressure on standing at clinic-had several vials of blood removed 1 week ago and has felt worse since then    Patient may be dehydrated-blood pressure 92/60 on standing at clinic-90/70 on standing with pulse rate of 118 EKG also with tachycardia.  Patient states is urinating every  3 to 4 hours    Advised patient to go " to the emergency room for further evaluation of tachycardia shortness of air and hypotension-and IVF.      2. Tachycardia  Comments:  Pt with tachy on exertion jose m with walking-EKG withST 109 and drop in BP on  standing- probable dehydration-started after-blood drawn 1 week ago    3. Shortness of breath  Comments:  Shortness of air with exertion O2 sats 96% at clinic    Other orders  -     ECG 12 Lead       Plan-12/18/2024  Patient Instructions   Patient with tachycardia and shortness of air with exertion-with drop in blood pressure on standing at clinic-had several vials of blood removed 1 week ago and has felt worse since then     Patient may be dehydrated-blood pressure 92/60 on standing at clinic-90/70 on standing with pulse rate of 118 EKG also with tachycardia.  Patient states is urinating every  3 to 4 hours     Advised patient to go to the emergency room for further evaluation of tachycardia shortness of air and hypotension-and IVF.           Patient Instructions   Patient with tachycardia and shortness of air with exertion-with drop in blood pressure on standing at clinic-had several vials of blood removed 1 week ago and has felt worse since then     Patient may be dehydrated-blood pressure 92/60 on standing at clinic-90/70 on standing with pulse rate of 118 EKG also with tachycardia.  Patient states is urinating every  3 to 4 hours     Advised patient to go to the emergency room for further evaluation of tachycardia shortness of air and hypotension-and IVF.     FOLLOW UP  Return if symptoms worsen or fail to improve, for Recheck, Next scheduled follow up.    Patient was given instructions and counseling regarding his condition or for health maintenance advice. Please see specific information pulled into the AVS if appropriate.     Older Notes  Decrease escitalopram 20 mg to 1/2 tablet daily  Check testosterone total and free levels today, and PSA  Start cialis 10 mg initially can increase to 20 mg every 36  hours or as needed  May need urology consult once labs back     Due for flu shot and tetanus vaccine-wants Tdap today   Recommend COVID-vaccine at pharmacy    10/15/2024 visit  patient here to establish care-just moved from [place], [place]-      Pt just got  1 1/2 weeks ago  Main concern is ED     Reviewed labs from previous PCP-Labs drawn 7/24/2024 with total cholesterol 160, LDL 90 HDL 47  ( vitamin D 12 in 7/25/2023), TSH equals 2.37 and normal on 7/20/2024, MND equals 35 on 7/24/2020, 12 equals 530 on 7/20/2024, comp with normal liver function test creatinine equals 1.2 glucose 96 ALT equals 24 potassium 4.3 total bilirubin 0.5 alk phos 75 UN 23 3141, A1c equals 6 on 7/20/2024, WBC 5.5 hemoglobin 14 lites 223 hematocrit 43 on 7/20/2024        PMH-ED, depression-on lexapro for 21 yrs, h/o OCD/anxiety, cholesterol, Aspergers  SH- Had lived with elderly parents-works in [employer] and a  in [employer] co-walks daily- 4500 steps-  -no cigs,ETOH or drugs  Just got  October 2024

## 2024-12-24 LAB
ESTRADIOL FREE MFR SERPL: 2.1 %
ESTRADIOL FREE SERPL-MCNC: 0.5 PG/ML
ESTRADIOL SERPL HS-MCNC: 24 PG/ML

## 2025-01-03 ENCOUNTER — OFFICE VISIT (OUTPATIENT)
Dept: INTERNAL MEDICINE | Age: 50
End: 2025-01-03
Payer: COMMERCIAL

## 2025-01-03 ENCOUNTER — TELEPHONE (OUTPATIENT)
Dept: INTERNAL MEDICINE | Age: 50
End: 2025-01-03

## 2025-01-03 VITALS
TEMPERATURE: 97.6 F | DIASTOLIC BLOOD PRESSURE: 72 MMHG | HEIGHT: 78 IN | BODY MASS INDEX: 26.66 KG/M2 | WEIGHT: 230.4 LBS | HEART RATE: 99 BPM | SYSTOLIC BLOOD PRESSURE: 122 MMHG | OXYGEN SATURATION: 98 %

## 2025-01-03 DIAGNOSIS — F32.9 REACTIVE DEPRESSION: ICD-10-CM

## 2025-01-03 DIAGNOSIS — S39.012A BACK STRAIN, INITIAL ENCOUNTER: ICD-10-CM

## 2025-01-03 DIAGNOSIS — R79.89 LOW TESTOSTERONE IN MALE: ICD-10-CM

## 2025-01-03 DIAGNOSIS — E78.00 PURE HYPERCHOLESTEROLEMIA: ICD-10-CM

## 2025-01-03 DIAGNOSIS — I26.99 ACUTE MASSIVE PULMONARY EMBOLISM: Primary | ICD-10-CM

## 2025-01-03 PROCEDURE — 99214 OFFICE O/P EST MOD 30 MIN: CPT | Performed by: INTERNAL MEDICINE

## 2025-01-03 RX ORDER — APIXABAN 5 MG/1
1 TABLET, FILM COATED ORAL EVERY 12 HOURS SCHEDULED
COMMUNITY
Start: 2024-12-21

## 2025-01-03 NOTE — PROGRESS NOTES
CHIEF COMPLAINT  Mark Benitez Jr. presents to [facility] for follow-up of Hospital Follow Up Visit (Patient was in [facility] and discharged on December 21, 2024).    HPI  Here with wife  50 yo pt with hyperlipidemia, depression , low testosterone-no tx initiated -seen 12/18/24 for soa, dizziness of one week-sent to ER-seen at [facility] and found to have mult pulm emboli--transferred to  ([place])- s/p surg procedure EKOS-[facility]-mechanical thrombectomy and ultrasound accelerated thrombolysis using EKOS- here for hosp f/u      Patient without any complaint of shortness of air patient is almost back to baseline except for some mild lower back discomfort but states he was lying on the hospital bed for several days-no loss of bowel or bladder movements no recent injury      Records reviewed-from [facility], meds reviewed in detail, labs reviewed with patient.  Plan of care is as follows below.    Hospital Course  Patient is a 49 year old M h/o HLD, anxiety presented to [facility]  with dizziness, fatigue, shortness of air and found to have  extensive b/l PEs on CT with RH strain. Trop 76, , started  on heparin and transferred to [facility] for EKOS.    Unprovoked Massive Pulmonary embolism  TTE with right heart strain  s/p EKOS cath  Denies personal or family history of clotting disorders  Continue Eliquis 10mg daliy for 7 days and then 5mg daily for 9  months  Follow up with oncology  Significant Findings  _  Procedures and Treatment Provided  SN - Proc - Procedure: CL Pulmonary Angiography SN  (12/19/24 13:30:38) SN - Proc - Procedure: CL EKOS SN  (12/19/24 11:49:45) SN - Proc - Procedure: CL Right Heart  Catheterization SN (12/19/24 11:49:30)    Discharge Medications  Home Medications (5) Active  apixaban 5 mg oral tablet 5 mg = 1 Tab, Oral, BID  apixaban 5 mg oral tablet 10 mg = 2 Tab, Oral, BID  Cialis 10 mg oral tablet 10 mg = 1 Tab,  "Oral, Daily  Lexapro 10 mg oral tablet 10 mg = 1 Tab, Oral, Daily  simvastatin 10 mg oral tablet 10 mg = 1 Tab, Oral, At Bedtime    PMFSH-Reviewed  ROS-no dizziness, no soa, back strain, no ecchymosis        Current Outpatient Medications:     Eliquis 5 MG tablet tablet, Take 1 tablet by mouth Every 12 (Twelve) Hours., Disp: , Rfl:     simvastatin (ZOCOR) 40 MG tablet, Take 1 tablet by mouth Every Night. 1/2 tablet daily, Disp: , Rfl:     tadalafil (CIALIS) 20 MG tablet, Take 1 tablet by mouth 3 (Three) Times a Week for 120 days., Disp: 20 tablet, Rfl: 4    escitalopram (LEXAPRO) 20 MG tablet, Take 1 tablet by mouth Daily. 1/2 tablet daily (Patient not taking: Reported on 1/3/2025), Disp: , Rfl:     ferrous sulfate 325 (65 Fe) MG tablet, Take 1 tablet by mouth., Disp: , Rfl:    PFSH reviewed.      OBJECTIVE  Vital Signs  Vitals:    01/03/25 0820   BP: 122/72   BP Location: Left arm   Patient Position: Sitting   Cuff Size: Adult   Pulse: 99   Temp: 97.6 °F (36.4 °C)   SpO2: 98%   Weight: 105 kg (230 lb 6.4 oz)   Height: 198.1 cm (78\")      Body mass index is 26.63 kg/m².    Physical Exam  Vitals and nursing note reviewed.   Constitutional:       Appearance: Normal appearance.   HENT:      Head: Normocephalic and atraumatic.      Nose: Nose normal.   Eyes:      Extraocular Movements: Extraocular movements intact.      Pupils: Pupils are equal, round, and reactive to light.   Cardiovascular:      Rate and Rhythm: Normal rate and regular rhythm.   Pulmonary:      Effort: Pulmonary effort is normal.      Breath sounds: Normal breath sounds.   Abdominal:      General: Abdomen is flat.      Palpations: Abdomen is soft.   Musculoskeletal:      Cervical back: Normal range of motion and neck supple.   Skin:     General: Skin is warm and dry.   Neurological:      General: No focal deficit present.      Mental Status: He is alert and oriented to person, place, and time.   Psychiatric:         Mood and Affect: Mood normal.       " "  Behavior: Behavior normal.          RESULTS REVIEW  No results found for: \"PROBNP\", \"BNP\"          Lab Results   Component Value Date    TSH 2.594 12/18/2024      No results found for: \"FREET4\"      Lab Results   Component Value Date    MG 2.2 12/18/2024     PSA          10/15/2024    09:41   PSA   PSA 1.280       XR Chest 2 View    Result Date: 12/17/2024  Impression: No acute process. Electronically Signed: Melody Cruz MD  12/17/2024 9:32 AM EST  Workstation ID: [id number]               ASSESSMENT & PLAN  Diagnoses and all orders for this visit:    1. Acute massive pulmonary embolism (Primary)  Overview:  seen 12/18/24 for soa, dizziness of one week-sent to ER-seen at [facility] and found to have mult pulm emboli--transferred to [facility]- s/p surg procedure EKOS-[facility]-mechanical thrombectomy and ultrasound accelerated thrombolysis using EKOS-     Assessment & Plan:  Unprovoked massive PE-status post EKOS-mechanical thrombectomy and ultrasound accelerated thrombolysis at [facility]    Orders:  -     Ambulatory Referral to Hematology / Oncology    2. Low testosterone in male  Comments:  Keep f/u with Urology -will need to avoid testosterone shots-on cialis prn    3. Back strain, initial encounter  Comments:  Probable low back strain from hospitalization-exercises given use Tylenol as needed no loss of bowel or bladder movements- no recent injury    4. Pure hypercholesterolemia  Comments:  Follow a low-cholesterol diet on no meds    5. Reactive depression  Comments:  Continue with Lexapro no HI or SI      Plan-1/3/2025  Patient Instructions   Take Eliquis  5mg one tablet po 2x/day  Back exercises given  F/u with cardiology and urologist       FOLLOW UP  Return if symptoms worsen or fail to improve, for Next scheduled follow up, Recheck.    Patient was given instructions and counseling regarding his condition or for health maintenance advice. Please see specific information pulled " into the AVS if appropriate.     Older Notes  December 18, 2024 12/18/2024  Patient Instructions   Patient with tachycardia and shortness of air with exertion-with drop in blood pressure on standing at clinic-had several vials of blood removed 1 week ago and has felt worse since then     Patient may be dehydrated-blood pressure 92/60 on standing at clinic-90/70 on standing with pulse rate of 118 EKG also with tachycardia.  Patient states is urinating every  3 to 4 hours     Advised patient to go to the emergency room for further evaluation of tachycardia shortness of air and hypotension-and IVF.        Older Notes  Decrease escitalopram 20 mg to 1/2 tablet daily  Check testosterone total and free levels today, and PSA  Start cialis 10 mg initially can increase to 20 mg every 36 hours or as needed  May need urology consult once labs back     Due for flu shot and tetanus vaccine-wants Tdap today   Recommend COVID-vaccine at pharmacy     10/15/2024 visit  patient here to establish care-just moved from [place], [place]-      Pt just got  1 1/2 weeks ago  Main concern is ED     Reviewed labs from previous PCP-Labs drawn 7/24/2024 with total cholesterol 160, LDL 90 HDL 47  ( vitamin D 12 in 7/25/2023), TSH equals 2.37 and normal on 7/20/2024, MND equals 35 on 7/24/2020, 12 equals 530 on 7/20/2024, comp with normal liver function test creatinine equals 1.2 glucose 96 ALT equals 24 potassium 4.3 total bilirubin 0.5 alk phos 75 UN 23 3141, A1c equals 6 on 7/20/2024, WBC 5.5 hemoglobin 14 lites 223 hematocrit 43 on 7/20/2024        PMH-ED, depression-on lexapro for 21 yrs, h/o OCD/anxiety, cholesterol, Aspergers    SH- Had lived with elderly parents-works in [employer] and a  in [employer] co-walks daily- 4500 steps-  -no cigs,ETOH or drugs  Just got  October 2024

## 2025-01-03 NOTE — TELEPHONE ENCOUNTER
Caller: Mark Benitez Jr.    Relationship: Self    Best call back number:     [phone]        What form or medical record are you requesting:  ALL LABS AND IMAGES RELATING TO  BLOOD CLOTS    Who is requesting this form or medical record from you: DR MAGALI MAN    How would you like to receive the form or medical records (pick-up, mail, fax): PATIENT ADVISED THAT ROYA IN OFFICE KNOWS THE FAX NUMBER TO SEND INFORMATION TO.     Timeframe paperwork needed: AS SOON AS POSSIBLE

## 2025-01-03 NOTE — ASSESSMENT & PLAN NOTE
Unprovoked massive PE-status post EKOS-mechanical thrombectomy and ultrasound accelerated thrombolysis at [facility]

## 2025-01-07 ENCOUNTER — TELEPHONE (OUTPATIENT)
Dept: INTERNAL MEDICINE | Age: 50
End: 2025-01-07

## 2025-01-07 NOTE — TELEPHONE ENCOUNTER
Caller: Mark Benitez Jr.    Relationship: Self    Best call back number:     [phone]       What was the call regarding: PATIENT STATES THAT HE IS JUST GOING TO SEE THE DR AT [facility]

## 2025-01-07 NOTE — TELEPHONE ENCOUNTER
Caller: Mark Benitez Jr.    Relationship: Self    Best call back number: [phone]     What medication are you requesting: Eliquis 5 MG tablet tablet     Have you had these symptoms before:    [x] Yes  [] No    Have you been treated for these symptoms before:   [x] Yes  [] No    If a prescription is needed, what is your preferred pharmacy and phone number: [facility] #[id number] - [place], [place] - [address]  AT [facility] [address] - [phone]  - [phone] FX     Additional notes: PATIENT STATES THAT HE DISCUSSED THIS WITH DR GONSALVES AT HIS APPOINTMENT ON 1.3.25 AND SHE SAID SHE WOULD FILL THIS MEDICATION

## 2025-01-08 ENCOUNTER — TELEPHONE (OUTPATIENT)
Dept: INTERNAL MEDICINE | Age: 50
End: 2025-01-08
Payer: COMMERCIAL

## 2025-01-08 RX ORDER — APIXABAN 5 MG/1
5 TABLET, FILM COATED ORAL EVERY 12 HOURS SCHEDULED
Qty: 60 TABLET | Refills: 1 | Status: SHIPPED | OUTPATIENT
Start: 2025-01-08

## 2025-02-07 ENCOUNTER — TRANSCRIBE ORDERS (OUTPATIENT)
Dept: ADMINISTRATIVE | Facility: HOSPITAL | Age: 50
End: 2025-02-07
Payer: COMMERCIAL

## 2025-02-07 DIAGNOSIS — R06.02 SHORTNESS OF BREATH: Primary | ICD-10-CM

## 2025-02-10 RX ORDER — APIXABAN 5 MG/1
5 TABLET, FILM COATED ORAL EVERY 12 HOURS SCHEDULED
Qty: 60 TABLET | Refills: 1 | Status: SHIPPED | OUTPATIENT
Start: 2025-02-10

## 2025-02-13 ENCOUNTER — HOSPITAL ENCOUNTER (OUTPATIENT)
Facility: HOSPITAL | Age: 50
Discharge: HOME | End: 2025-02-13
Admitting: STUDENT IN AN ORGANIZED HEALTH CARE EDUCATION/TRAINING PROGRAM
Payer: COMMERCIAL

## 2025-02-13 DIAGNOSIS — R06.02 SHORTNESS OF BREATH: ICD-10-CM

## 2025-02-13 LAB
ASCENDING AORTA: 3.2 CM
AV MEAN PRESS GRAD SYS DOP V1V2: 5 MMHG
BH CV ECHO MEAS - AO ROOT DIAM: 3 CM
BH CV ECHO MEAS - AO V2 VTI: 28.7 CM
BH CV ECHO MEAS - AVA(I,D): 2.6 CM2
BH CV ECHO MEAS - EDV(CUBED): 140.6 ML
BH CV ECHO MEAS - EDV(MOD-SP2): 95.8 ML
BH CV ECHO MEAS - EDV(MOD-SP4): 119 ML
BH CV ECHO MEAS - EF(MOD-SP2): 63.6 %
BH CV ECHO MEAS - EF(MOD-SP4): 66.3 %
BH CV ECHO MEAS - ESV(CUBED): 24.6 ML
BH CV ECHO MEAS - ESV(MOD-SP2): 34.9 ML
BH CV ECHO MEAS - ESV(MOD-SP4): 40.1 ML
BH CV ECHO MEAS - FS: 44 %
BH CV ECHO MEAS - IVS/LVPW: 0.97 CM
BH CV ECHO MEAS - IVSD: 0.95 CM
BH CV ECHO MEAS - LA DIMENSION: 3.5 CM
BH CV ECHO MEAS - LAT PEAK E' VEL: 11 CM/SEC
BH CV ECHO MEAS - LV DIASTOLIC VOL/BSA (35-75): 49.7 CM2
BH CV ECHO MEAS - LV MASS(C)D: 183.7 GRAMS
BH CV ECHO MEAS - LV MAX PG: 5.8 MMHG
BH CV ECHO MEAS - LV MEAN PG: 3 MMHG
BH CV ECHO MEAS - LV SYSTOLIC VOL/BSA (12-30): 16.7 CM2
BH CV ECHO MEAS - LV V1 MAX: 120 CM/SEC
BH CV ECHO MEAS - LV V1 VTI: 23.8 CM
BH CV ECHO MEAS - LVIDD: 5.2 CM
BH CV ECHO MEAS - LVIDS: 2.9 CM
BH CV ECHO MEAS - LVOT AREA: 3.1 CM2
BH CV ECHO MEAS - LVOT DIAM: 2 CM
BH CV ECHO MEAS - LVPWD: 0.97 CM
BH CV ECHO MEAS - MED PEAK E' VEL: 9.3 CM/SEC
BH CV ECHO MEAS - MR MAX PG: 98.4 MMHG
BH CV ECHO MEAS - MR MAX VEL: 496 CM/SEC
BH CV ECHO MEAS - MV A MAX VEL: 52.7 CM/SEC
BH CV ECHO MEAS - MV DEC SLOPE: 496 CM/SEC2
BH CV ECHO MEAS - MV DEC TIME: 0.16 SEC
BH CV ECHO MEAS - MV E MAX VEL: 79.3 CM/SEC
BH CV ECHO MEAS - MV E/A: 1.5
BH CV ECHO MEAS - MV MEAN PG: 1 MMHG
BH CV ECHO MEAS - MV P1/2T: 65.5 MSEC
BH CV ECHO MEAS - MV V2 VTI: 26.5 CM
BH CV ECHO MEAS - MVA(P1/2T): 3.4 CM2
BH CV ECHO MEAS - MVA(VTI): 2.8 CM2
BH CV ECHO MEAS - RVDD: 2.5 CM
BH CV ECHO MEAS - SV(LVOT): 74.8 ML
BH CV ECHO MEAS - SV(MOD-SP2): 60.9 ML
BH CV ECHO MEAS - SV(MOD-SP4): 78.9 ML
BH CV ECHO MEAS - SVI(LVOT): 31.2 ML/M2
BH CV ECHO MEAS - SVI(MOD-SP2): 25.4 ML/M2
BH CV ECHO MEAS - SVI(MOD-SP4): 32.9 ML/M2
BH CV ECHO MEASUREMENTS AVERAGE E/E' RATIO: 7.81
BH CV XLRA - TDI S': 13.3 CM/SEC
LEFT ATRIUM VOLUME INDEX: 19.1 ML/M2
LV EF BIPLANE MOD: 63.8 %
MV VALVE AREA BY PLANIMETRY: 3.28 CM2

## 2025-02-13 PROCEDURE — 93306 TTE W/DOPPLER COMPLETE: CPT

## 2025-03-13 DIAGNOSIS — E29.1 HYPOGONADISM IN MALE: ICD-10-CM

## 2025-03-13 DIAGNOSIS — N52.9 ERECTILE DYSFUNCTION, UNSPECIFIED ERECTILE DYSFUNCTION TYPE: ICD-10-CM

## 2025-03-13 RX ORDER — TADALAFIL 20 MG/1
20 TABLET ORAL 3 TIMES WEEKLY
Qty: 20 TABLET | Refills: 4 | OUTPATIENT
Start: 2025-03-14 | End: 2025-07-12

## 2025-03-13 NOTE — TELEPHONE ENCOUNTER
Caller: Mark Benitez Jr.    Relationship: Self    Best call back number:   Telephone Information:   Mobile [phone]        Requested Prescriptions:   Requested Prescriptions     Pending Prescriptions Disp Refills    tadalafil (CIALIS) 20 MG tablet 20 tablet 4     Sig: Take 1 tablet by mouth 3 (Three) Times a Week for 120 days.        Pharmacy where request should be sent: [id number] [place] - [address]  AT [facility] [address] - [phone] [facility] [phone] FX     Last office visit with prescribing clinician: 1/3/2025   Last telemedicine visit with prescribing clinician: Visit date not found   Next office visit with prescribing clinician: Visit date not found         Does the patient have less than a 3 day supply:  [x] Yes  [] No      Terese Díaz Rep   03/13/25 10:26 EDT

## 2025-03-18 ENCOUNTER — TELEMEDICINE (OUTPATIENT)
Age: 50
End: 2025-03-18
Payer: COMMERCIAL

## 2025-03-18 ENCOUNTER — TELEPHONE (OUTPATIENT)
Dept: INTERNAL MEDICINE | Age: 50
End: 2025-03-18

## 2025-03-18 DIAGNOSIS — U07.1 COVID: Primary | ICD-10-CM

## 2025-03-18 PROCEDURE — 99213 OFFICE O/P EST LOW 20 MIN: CPT | Performed by: NURSE PRACTITIONER

## 2025-03-18 RX ORDER — LORATADINE 10 MG/1
10 TABLET ORAL DAILY
Qty: 30 TABLET | Refills: 0 | Status: SHIPPED | OUTPATIENT
Start: 2025-03-18 | End: 2025-04-17

## 2025-03-18 RX ORDER — GUAIFENESIN 600 MG/1
1200 TABLET, EXTENDED RELEASE ORAL 2 TIMES DAILY
Qty: 30 TABLET | Refills: 0 | Status: SHIPPED | OUTPATIENT
Start: 2025-03-18

## 2025-03-18 NOTE — PROGRESS NOTES
Chief Complaint  No chief complaint on file.    Subjective    Mark Benitez Jr. is a 49 y.o. male who presents to [facility] at [address] [place] [address] for evaluation via video conferencing visit.  The patient's current location is home at address listed in [place]. You have chosen to receive care through a telehealth visit.  Do you consent to use a video/audio connection for your medical care today? Yes.       History of Present Illness  49-year-old white male calls in today requesting telemedicine visit as he tested positive for COVID today.  His symptoms started late Sunday night he is having cough congestion mild headache feels like a head cold however his wife insisted he be tested and when he did test it showed up positive almost immediately.  He does work outside the home.  We have discussed isolation protocol he will state off work to minimal Friday if feeling okay and no fever Friday he can return to work but must wear mask additional 5 days.  He denies fever chills no shortness of air.  Encouraged him to force fluids rest he is not a candidate for Paxlovid due to his Eliquis use will treat symptoms discussed possibility of secondary infection if those present let us know right away.       Past Medical History:   Diagnosis Date    ADHD (attention deficit hyperactivity disorder)     Allergic     Anxiety     Depression     Erectile disorder     Kidney stone         History reviewed. No pertinent surgical history.     Social History     Tobacco Use   Smoking Status Never    Passive exposure: Never   Smokeless Tobacco Never        Patient Care Team:  Romy Rueda MD as PCP - General (Internal Medicine)  Diego Dubois MD (Cardiology)    No Known Allergies       Current Outpatient Medications:     Eliquis 5 MG tablet tablet, Take 1 tablet by mouth Every 12 (Twelve) Hours., Disp: 60 tablet, Rfl: 1    escitalopram (LEXAPRO) 20 MG  tablet, Take 1 tablet by mouth Daily. 1/2 tablet daily (Patient not taking: Reported on 1/3/2025), Disp: , Rfl:     ferrous sulfate 325 (65 Fe) MG tablet, Take 1 tablet by mouth., Disp: , Rfl:     guaiFENesin (Mucinex) 600 MG 12 hr tablet, Take 2 tablets by mouth 2 (Two) Times a Day., Disp: 30 tablet, Rfl: 0    loratadine (CLARITIN) 10 MG tablet, Take 1 tablet by mouth Daily for 30 days., Disp: 30 tablet, Rfl: 0    simvastatin (ZOCOR) 40 MG tablet, Take 1 tablet by mouth Every Night. 1/2 tablet daily, Disp: , Rfl:     tadalafil (CIALIS) 20 MG tablet, Take 1 tablet by mouth 3 (Three) Times a Week for 120 days., Disp: 20 tablet, Rfl: 4    Objective     BP Readings from Last 3 Encounters:   01/03/25 122/72   12/18/24 92/60   12/17/24 103/59      Wt Readings from Last 3 Encounters:   01/03/25 105 kg (230 lb 6.4 oz)   12/18/24 107 kg (236 lb)   12/17/24 109 kg (240 lb)       Physical Exam  Constitutional:       General: The patient is not in acute distress.  Pulmonary:      Effort: Pulmonary effort is normal.   Neurological:      General: No focal deficit present.      Mental Status: The patient is alert.   Psychiatric:         Thought Content: Thought content normal.        Able to hold conversation without any signs or symptoms of respiratory distress.    Result Review   The following data was reviewed by: XIMENA Strange on 03/18/2025:  []  Tests & Results  []  Hospitalization/Emergency Department/Urgent Care  []  Internal/External Consultant Notes       Assessment and Plan     Diagnoses and all orders for this visit:    1. COVID (Primary)  -     loratadine (CLARITIN) 10 MG tablet; Take 1 tablet by mouth Daily for 30 days.  Dispense: 30 tablet; Refill: 0  -     guaiFENesin (Mucinex) 600 MG 12 hr tablet; Take 2 tablets by mouth 2 (Two) Times a Day.  Dispense: 30 tablet; Refill: 0         Assessment & Plan          Patient was given instructions and counseling regarding his condition or for health maintenance  advice. Please see specific information pulled into the AVS if appropriate.     Follow Up   No follow-ups on file.    Patient or patient representative verbalized consent for the use of Ambient Listening during the visit with  XIMENA Strange for chart documentation. 3/18/2025  11:59 EDT    The visit included audio and video interaction. No technical issues occurred during this visit. The provider spent 12:00 minutes with the patient during the video conferencing visit via [employer].The following staff were present during the visit: None.    XIMENA Strange

## 2025-03-18 NOTE — PATIENT INSTRUCTIONS
Force fluids  Increase rest  Cough and deep breathe every 2 hours while awake  Call if symptoms worsen or persist  Isolate for 5 days from the start of symptoms wear a mask 10 days  Not candidate for Paxlovid due to Eliquis

## 2025-03-18 NOTE — TELEPHONE ENCOUNTER
CALLER NOT ON VERBAL    Caller: ANICETO SINGH    Relationship: Emergency Contact    Best call back number: [phone]     What medication are you requesting: PAXLOVID    What are your current symptoms: COUGH, CONGESTION, SNEEZING, WATERY EYES, SORE THROAT    How long have you been experiencing symptoms: 2 DAYS       If a prescription is needed, what is your preferred pharmacy and phone number: [facility] #[id number] - [place], [place] - [address] AT [facility] [address] - [phone]  - [phone] FX     Additional notes:PATIENTS WIFE STATES THE PATIENT TESTED POSITIVE FOR COVID VIA HOME TEST AND WOULD LIKE MEDICATION CALLED INTO THE PHARMACY   PATIENTS WIFE WOULD LIKE A CALL BACK          Statement Selected

## 2025-03-31 DIAGNOSIS — E29.1 HYPOGONADISM IN MALE: ICD-10-CM

## 2025-03-31 DIAGNOSIS — N52.9 ERECTILE DYSFUNCTION, UNSPECIFIED ERECTILE DYSFUNCTION TYPE: ICD-10-CM

## 2025-03-31 RX ORDER — TADALAFIL 20 MG/1
20 TABLET ORAL 3 TIMES WEEKLY
Qty: 20 TABLET | Refills: 4 | Status: SHIPPED | OUTPATIENT
Start: 2025-03-31 | End: 2025-07-29

## 2025-03-31 NOTE — TELEPHONE ENCOUNTER
Rx Refill Note  Requested Prescriptions      No prescriptions requested or ordered in this encounter      Last office visit with prescribing clinician: 1/3/2025   Last telemedicine visit with prescribing clinician: Visit date not found   Next office visit with prescribing clinician: Visit date not found                         Would you like a call back once the refill request has been completed: [] Yes [] No    If the office needs to give you a call back, can they leave a voicemail: [] Yes [] No    Ainsley Huynh MA  03/31/25, 15:48 EDT

## 2025-07-23 ENCOUNTER — TELEPHONE (OUTPATIENT)
Dept: INTERNAL MEDICINE | Age: 50
End: 2025-07-23

## 2025-07-23 ENCOUNTER — OFFICE VISIT (OUTPATIENT)
Dept: INTERNAL MEDICINE | Age: 50
End: 2025-07-23
Payer: COMMERCIAL

## 2025-07-23 VITALS
DIASTOLIC BLOOD PRESSURE: 78 MMHG | OXYGEN SATURATION: 98 % | BODY MASS INDEX: 28.97 KG/M2 | WEIGHT: 250.4 LBS | HEART RATE: 72 BPM | TEMPERATURE: 97.4 F | SYSTOLIC BLOOD PRESSURE: 128 MMHG | HEIGHT: 78 IN

## 2025-07-23 DIAGNOSIS — F32.9 REACTIVE DEPRESSION: ICD-10-CM

## 2025-07-23 DIAGNOSIS — I26.99 ACUTE MASSIVE PULMONARY EMBOLISM: ICD-10-CM

## 2025-07-23 DIAGNOSIS — Z23 NEED FOR SHINGLES VACCINE: ICD-10-CM

## 2025-07-23 DIAGNOSIS — F84.5: ICD-10-CM

## 2025-07-23 DIAGNOSIS — Z00.00 ROUTINE HISTORY AND PHYSICAL EXAMINATION OF ADULT: ICD-10-CM

## 2025-07-23 DIAGNOSIS — H61.21 IMPACTED CERUMEN OF RIGHT EAR: ICD-10-CM

## 2025-07-23 DIAGNOSIS — E78.00 PURE HYPERCHOLESTEROLEMIA: Primary | ICD-10-CM

## 2025-07-23 DIAGNOSIS — Z11.59 NEED FOR HEPATITIS C SCREENING TEST: ICD-10-CM

## 2025-07-23 DIAGNOSIS — N52.9 ERECTILE DISORDER: ICD-10-CM

## 2025-07-23 DIAGNOSIS — R73.03 PREDIABETES: ICD-10-CM

## 2025-07-23 DIAGNOSIS — Z23 NEED FOR INFLUENZA VACCINATION: ICD-10-CM

## 2025-07-23 LAB
25(OH)D3 SERPL-MCNC: 38.1 NG/ML (ref 30–100)
ALBUMIN SERPL-MCNC: 4.4 G/DL (ref 3.5–5.2)
ALBUMIN/GLOB SERPL: 1.6 G/DL
ALP SERPL-CCNC: 79 U/L (ref 39–117)
ALT SERPL W P-5'-P-CCNC: 15 U/L (ref 1–41)
ANION GAP SERPL CALCULATED.3IONS-SCNC: 10.1 MMOL/L (ref 5–15)
AST SERPL-CCNC: 22 U/L (ref 1–40)
BASOPHILS # BLD AUTO: 0.02 10*3/MM3 (ref 0–0.2)
BASOPHILS NFR BLD AUTO: 0.4 % (ref 0–1.5)
BILIRUB SERPL-MCNC: 0.5 MG/DL (ref 0–1.2)
BUN SERPL-MCNC: 18 MG/DL (ref 6–20)
BUN/CREAT SERPL: 13.7 (ref 7–25)
CALCIUM SPEC-SCNC: 10 MG/DL (ref 8.6–10.5)
CHLORIDE SERPL-SCNC: 102 MMOL/L (ref 98–107)
CHOLEST SERPL-MCNC: 173 MG/DL (ref 0–200)
CO2 SERPL-SCNC: 25.9 MMOL/L (ref 22–29)
CREAT SERPL-MCNC: 1.31 MG/DL (ref 0.76–1.27)
DEPRECATED RDW RBC AUTO: 39.7 FL (ref 37–54)
EGFRCR SERPLBLD CKD-EPI 2021: 66.3 ML/MIN/1.73
EOSINOPHIL # BLD AUTO: 0.1 10*3/MM3 (ref 0–0.4)
EOSINOPHIL NFR BLD AUTO: 1.8 % (ref 0.3–6.2)
ERYTHROCYTE [DISTWIDTH] IN BLOOD BY AUTOMATED COUNT: 12.3 % (ref 12.3–15.4)
FOLATE SERPL-MCNC: >20 NG/ML (ref 4.78–24.2)
GLOBULIN UR ELPH-MCNC: 2.8 GM/DL
GLUCOSE SERPL-MCNC: 97 MG/DL (ref 65–99)
HBA1C MFR BLD: 5.9 % (ref 4.8–5.6)
HCT VFR BLD AUTO: 42.4 % (ref 37.5–51)
HCV AB SER QL: NORMAL
HDLC SERPL-MCNC: 54 MG/DL (ref 40–60)
HGB BLD-MCNC: 14.5 G/DL (ref 13–17.7)
IMM GRANULOCYTES # BLD AUTO: 0.02 10*3/MM3 (ref 0–0.05)
IMM GRANULOCYTES NFR BLD AUTO: 0.4 % (ref 0–0.5)
LDLC SERPL CALC-MCNC: 91 MG/DL (ref 0–100)
LDLC/HDLC SERPL: 1.61 {RATIO}
LYMPHOCYTES # BLD AUTO: 1.48 10*3/MM3 (ref 0.7–3.1)
LYMPHOCYTES NFR BLD AUTO: 26.5 % (ref 19.6–45.3)
MAGNESIUM SERPL-MCNC: 2.4 MG/DL (ref 1.6–2.6)
MCH RBC QN AUTO: 30.5 PG (ref 26.6–33)
MCHC RBC AUTO-ENTMCNC: 34.2 G/DL (ref 31.5–35.7)
MCV RBC AUTO: 89.1 FL (ref 79–97)
MONOCYTES # BLD AUTO: 0.43 10*3/MM3 (ref 0.1–0.9)
MONOCYTES NFR BLD AUTO: 7.7 % (ref 5–12)
NEUTROPHILS NFR BLD AUTO: 3.54 10*3/MM3 (ref 1.7–7)
NEUTROPHILS NFR BLD AUTO: 63.2 % (ref 42.7–76)
NRBC BLD AUTO-RTO: 0 /100 WBC (ref 0–0.2)
PLATELET # BLD AUTO: 248 10*3/MM3 (ref 140–450)
PMV BLD AUTO: 10.3 FL (ref 6–12)
POTASSIUM SERPL-SCNC: 5.1 MMOL/L (ref 3.5–5.2)
PROT SERPL-MCNC: 7.2 G/DL (ref 6–8.5)
RBC # BLD AUTO: 4.76 10*6/MM3 (ref 4.14–5.8)
SODIUM SERPL-SCNC: 138 MMOL/L (ref 136–145)
T3FREE SERPL-MCNC: 3.74 PG/ML (ref 2–4.4)
T4 FREE SERPL-MCNC: 1.24 NG/DL (ref 0.92–1.68)
TRIGL SERPL-MCNC: 161 MG/DL (ref 0–150)
TSH SERPL DL<=0.05 MIU/L-ACNC: 2.18 UIU/ML (ref 0.27–4.2)
VIT B12 BLD-MCNC: 780 PG/ML (ref 211–946)
VLDLC SERPL-MCNC: 28 MG/DL (ref 5–40)
WBC NRBC COR # BLD AUTO: 5.59 10*3/MM3 (ref 3.4–10.8)

## 2025-07-23 PROCEDURE — 80061 LIPID PANEL: CPT | Performed by: INTERNAL MEDICINE

## 2025-07-23 PROCEDURE — 84481 FREE ASSAY (FT-3): CPT | Performed by: INTERNAL MEDICINE

## 2025-07-23 PROCEDURE — 82607 VITAMIN B-12: CPT | Performed by: INTERNAL MEDICINE

## 2025-07-23 PROCEDURE — 80050 GENERAL HEALTH PANEL: CPT | Performed by: INTERNAL MEDICINE

## 2025-07-23 PROCEDURE — 84439 ASSAY OF FREE THYROXINE: CPT | Performed by: INTERNAL MEDICINE

## 2025-07-23 PROCEDURE — 83036 HEMOGLOBIN GLYCOSYLATED A1C: CPT | Performed by: INTERNAL MEDICINE

## 2025-07-23 PROCEDURE — 82306 VITAMIN D 25 HYDROXY: CPT | Performed by: INTERNAL MEDICINE

## 2025-07-23 PROCEDURE — 86803 HEPATITIS C AB TEST: CPT | Performed by: INTERNAL MEDICINE

## 2025-07-23 PROCEDURE — 82746 ASSAY OF FOLIC ACID SERUM: CPT | Performed by: INTERNAL MEDICINE

## 2025-07-23 PROCEDURE — 83735 ASSAY OF MAGNESIUM: CPT | Performed by: INTERNAL MEDICINE

## 2025-07-23 RX ORDER — APIXABAN 5 MG/1
5 TABLET, FILM COATED ORAL EVERY 12 HOURS SCHEDULED
Qty: 180 TABLET | Refills: 1 | Status: SHIPPED | OUTPATIENT
Start: 2025-07-23

## 2025-07-23 RX ORDER — ESCITALOPRAM OXALATE 20 MG/1
20 TABLET ORAL DAILY
Qty: 45 TABLET | Refills: 1 | Status: SHIPPED | OUTPATIENT
Start: 2025-07-23

## 2025-07-23 NOTE — PATIENT INSTRUCTIONS
Use Debrox eardrops to right ear for 7 to 10 days then can return to clinic for ear irrigation    Do labs today    Physical is due after November 21, 2025

## 2025-07-23 NOTE — PROGRESS NOTES
"CHIEF COMPLAINT  Mark Benitez Jr. presents to [facility] to Primary Care Follow-Up (50 year old male here needs to have a follow up he wants a wellness and labs done.  Patient says he is done with hematology and cardiology everything has gotten better. )     HPI    History of Present Illness  The patient is a 50-year-old male with a history of hyperlipidemia, depression, and multiple pulmonary emboli status post EKOS mechanical thrombectomy and ultrasound-accelerated thrombolysis in 2024, here for a routine follow-up. He was seen at the cardiology clinic by Dr. Dubois at [facility] on 04/15/2025 and reports feeling better and back to his baseline. He is not experiencing any bleeding or shortness of breath. Hematology workup revealed an underlying predisposition for a clotting disorder, including positive prothrombin mutation, weakly positive LAC, and positive MTHFR, indicating the need for lifelong anticoagulation.      He has had three consultations with a hematologist at [facility], who has informed him that no further visits are necessary unless he has to be on blood thinners for the rest of his life due to a genetic condition.  Reviewed his hematology note Crystal 7/8/2025 which showed that he was negative for lupus anticoagulant and no family history of antiphospholipid syndrome.  He was told that lifelong anticoagulation is recommended because of the massive PE .  Oncology note on 7/8/2025 showed \"we discussed my recommendation for indefinite ACand that he needs age appropriate cancer screening. He  states he is half Omani and has a family history of  thalassemia and his sister who is a physician is JOHN positive\"    his most recent visit to Dr. Dubois was on 07/08/2025, where he was told that his heart has completely healed and he is in good health, eliminating the need for further visits. He was initially considered for Coumadin therapy but it was deemed unnecessary. He was also " tested for lupus, which returned negative results. He is currently on Eliquis 5 mg twice daily.    He continues to take Lexapro 10 mg half tablet daily, which he finds beneficial for his mood. He plans to discontinue it around November 2025 when he relocates.    He is also on simvastatin half tablet daily and reports no issues with muscle cramps. He has not had any recent blood work done and is due for a wellness exam today. He typically gets his blood work done in [place], [place]. He has lost about 10 pounds during a hospital stay and currently weighs 242 pounds. He maintains an active lifestyle, working out three days a week, and takes a creatine supplement. He has not yet undergone a colonoscopy but plans to do so after moving to [place] in the fall. He reports no shortness of breath or chest pain. He is also taking Cialis and loratadine 10 mg daily for allergies, which he uses approximately once every couple of weeks.    SOCIAL HISTORY  He does not smoke.    FAMILY HISTORY  His sister is JOHN positive. His maternal grandfather or grandmother had diabetes.    MEDICATIONS  Eliquis, Lexapro, simvastatin, Cialis, loratadine    IMMUNIZATIONS  He received the Shingrix vaccine in July.    Patient was seen at cardiology clinic by Dr. Dubois at [facility] April 15, 2025 and states he has been feeling been feeling better and back to his baseline no bleeding no shortness of air-Ejection fraction was 63% with borderline dilated left ventricular cavity LV diastolic function was normal and trace mitral regurg.  Hematology workup showed underlying predisposition for clotting disorder workup showed positive prothrombin mutation weakly positive LAC and positive anti-H FR so will need lifelong anticoagulation.    He states he is half Samoan and has a family history of thalassemia and  his sister who is a physician is JOHN positive but asymptomatic.  Will have patient RTC in 3 mos with CBC    Records reviewed, meds reviewed in  "detail, labs reviewed with patient.  Plan of care is as follows below.      SH--moving to [place] in 11/2025    [facility]  ROS-no myalgias    Past History:  Allergies: Patient has no known allergies.   Medical History: has a past medical history of ADHD (attention deficit hyperactivity disorder), Allergic, Anxiety, Depression, Erectile disorder, and Kidney stone.   Surgical History: has no past surgical history on file.   Family History: family history includes COPD in his father; Diabetes in his maternal grandfather, paternal grandmother, and paternal uncle; Heart disease in his paternal grandmother.   Social History: reports that he has never smoked. He has never been exposed to tobacco smoke. He has never used smokeless tobacco. He reports that he does not drink alcohol and does not use drugs.  Social History     Social History Narrative    Not on file         Current Outpatient Medications:     Eliquis 5 MG tablet tablet, Take 1 tablet by mouth Every 12 (Twelve) Hours., Disp: 180 tablet, Rfl: 1    escitalopram (LEXAPRO) 20 MG tablet, Take 1 tablet by mouth Daily. 1/2 tablet daily, Disp: 45 tablet, Rfl: 1    guaiFENesin (Mucinex) 600 MG 12 hr tablet, Take 2 tablets by mouth 2 (Two) Times a Day., Disp: 30 tablet, Rfl: 0    simvastatin (ZOCOR) 40 MG tablet, Take 1 tablet by mouth Every Night. 1/2 tablet daily, Disp: , Rfl:     tadalafil (CIALIS) 20 MG tablet, Take 1 tablet by mouth 3 (Three) Times a Week for 120 days., Disp: 20 tablet, Rfl: 4    loratadine (CLARITIN) 10 MG tablet, Take 1 tablet by mouth Daily for 30 days., Disp: 30 tablet, Rfl: 0     OBJECTIVE  Vital Signs  Vitals:    07/23/25 0903   BP: 128/78   BP Location: Left arm   Patient Position: Sitting   Cuff Size: Small Adult   Pulse: 72   Temp: 97.4 °F (36.3 °C)   SpO2: 98%   Weight: 114 kg (250 lb 6.4 oz)   Height: 198.1 cm (78\")      Body mass index is 28.94 kg/m².    Physical Exam  Soft wax noted in the right ear. Left ear appears normal with " "no fluid, infection, or wax.  Lungs are clear. No wheezing detected.  Heart exhibits a regular rate and rhythm.    Vital Signs  Blood pressure measures 128/78.      Physical Exam  Vitals and nursing note reviewed.   Constitutional:       Appearance: Normal appearance.   HENT:      Head: Normocephalic and atraumatic.      Nose: Nose normal.   Eyes:      Extraocular Movements: Extraocular movements intact.      Pupils: Pupils are equal, round, and reactive to light.   Cardiovascular:      Rate and Rhythm: Normal rate and regular rhythm.   Pulmonary:      Effort: Pulmonary effort is normal.      Breath sounds: Normal breath sounds.   Abdominal:      General: Abdomen is flat.      Palpations: Abdomen is soft.   Musculoskeletal:      Cervical back: Normal range of motion and neck supple.   Skin:     General: Skin is warm and dry.   Neurological:      General: No focal deficit present.      Mental Status: He is alert and oriented to person, place, and time.   Psychiatric:         Mood and Affect: Mood normal.         Behavior: Behavior normal.         RESULTS REVIEW  No results found for: \"PROBNP\", \"BNP\"          Lab Results   Component Value Date    TSH 2.594 12/18/2024      No results found for: \"FREET4\"      PSA          10/15/2024    09:41   PSA   PSA 1.280       No Images in the past 120 days found..       Results  Laboratory Studies  Ejection fraction was 63% with borderline dilated left ventricular cavity. LV diastolic function was normal and trace mitral regurgitation. Hematology workup showed underlying predisposition for clotting disorder with positive prothrombin mutation, weakly positive LAC, and positive MTHFR. LDL was about 90, cholesterol 160, triglycerides 191 in November of last year. A1c was 6 in July.             ASSESSMENT & PLAN  Diagnoses and all orders for this visit:    1. Pure hypercholesterolemia (Primary)  -     Comprehensive Metabolic Panel; Future  -     Lipid Panel; Future  -     CBC & " Differential; Future  -     Vitamin B12; Future  -     Folate; Future  -     Vitamin D,25-Hydroxy; Future  -     Magnesium; Future  -     Microalbumin / Creatinine Urine Ratio - Urine, Clean Catch; Future  -     Hemoglobin A1c; Future  -     TSH+Free T4; Future  -     T3, Free; Future  -     T3, Free  -     TSH+Free T4  -     Hemoglobin A1c  -     Magnesium  -     Vitamin D,25-Hydroxy  -     Folate  -     Vitamin B12  -     CBC & Differential  -     Lipid Panel  -     Comprehensive Metabolic Panel  -     Cancel: Hepatitis C Antibody  -     Cancel: CBC & Differential  -     Cancel: Vitamin D,25-Hydroxy  -     Cancel: Magnesium  -     Cancel: Folate  -     Cancel: Vitamin B12  -     Cancel: T3, Free  -     Cancel: TSH+Free T4  -     Cancel: Lipid Panel  -     Cancel: Comprehensive Metabolic Panel    2. Acute massive pulmonary embolism  Overview:  seen 12/18/24 for soa, dizziness of one week-sent to ER-seen at [facility] and found to have mult pulm emboli--transferred to [facility]- s/p surg procedure EKOS-[facility]-mechanical thrombectomy and ultrasound accelerated thrombolysis using EKOS-     Orders:  -     Comprehensive Metabolic Panel; Future  -     Lipid Panel; Future  -     CBC & Differential; Future  -     Vitamin B12; Future  -     Folate; Future  -     Vitamin D,25-Hydroxy; Future  -     Magnesium; Future  -     Microalbumin / Creatinine Urine Ratio - Urine, Clean Catch; Future  -     Hemoglobin A1c; Future  -     TSH+Free T4; Future  -     T3, Free; Future  -     Eliquis 5 MG tablet tablet; Take 1 tablet by mouth Every 12 (Twelve) Hours.  Dispense: 180 tablet; Refill: 1  -     T3, Free  -     TSH+Free T4  -     Hemoglobin A1c  -     Magnesium  -     Vitamin D,25-Hydroxy  -     Folate  -     Vitamin B12  -     CBC & Differential  -     Lipid Panel  -     Comprehensive Metabolic Panel    3. Reactive depression  -     Comprehensive Metabolic Panel; Future  -     Lipid Panel; Future  -     CBC  & Differential; Future  -     Vitamin B12; Future  -     Folate; Future  -     Vitamin D,25-Hydroxy; Future  -     Magnesium; Future  -     Microalbumin / Creatinine Urine Ratio - Urine, Clean Catch; Future  -     Hemoglobin A1c; Future  -     TSH+Free T4; Future  -     T3, Free; Future  -     escitalopram (LEXAPRO) 20 MG tablet; Take 1 tablet by mouth Daily. 1/2 tablet daily  Dispense: 45 tablet; Refill: 1  -     T3, Free  -     TSH+Free T4  -     Hemoglobin A1c  -     Magnesium  -     Vitamin D,25-Hydroxy  -     Folate  -     Vitamin B12  -     CBC & Differential  -     Lipid Panel  -     Comprehensive Metabolic Panel  -     Cancel: Hepatitis C Antibody  -     Cancel: CBC & Differential  -     Cancel: Vitamin D,25-Hydroxy  -     Cancel: Magnesium  -     Cancel: Folate  -     Cancel: Vitamin B12  -     Cancel: T3, Free  -     Cancel: TSH+Free T4  -     Cancel: Lipid Panel  -     Cancel: Comprehensive Metabolic Panel    4. Erectile disorder  Comments:  Has appointment with Dr. Elder urology December 2024  Orders:  -     Cancel: Hepatitis C Antibody  -     Cancel: CBC & Differential  -     Cancel: Vitamin D,25-Hydroxy  -     Cancel: Magnesium  -     Cancel: Folate  -     Cancel: Vitamin B12  -     Cancel: T3, Free  -     Cancel: TSH+Free T4  -     Cancel: Lipid Panel  -     Cancel: Comprehensive Metabolic Panel    5. Prediabetes  -     Comprehensive Metabolic Panel; Future  -     Lipid Panel; Future  -     CBC & Differential; Future  -     Vitamin B12; Future  -     Folate; Future  -     Vitamin D,25-Hydroxy; Future  -     Magnesium; Future  -     Microalbumin / Creatinine Urine Ratio - Urine, Clean Catch; Future  -     Hemoglobin A1c; Future  -     TSH+Free T4; Future  -     T3, Free; Future  -     T3, Free  -     TSH+Free T4  -     Hemoglobin A1c  -     Magnesium  -     Vitamin D,25-Hydroxy  -     Folate  -     Vitamin B12  -     CBC & Differential  -     Lipid Panel  -     Comprehensive Metabolic Panel  -      Cancel: Hemoglobin A1c  -     Cancel: Hepatitis C Antibody  -     Cancel: CBC & Differential  -     Cancel: Vitamin D,25-Hydroxy  -     Cancel: Magnesium  -     Cancel: Folate  -     Cancel: Vitamin B12  -     Cancel: T3, Free  -     Cancel: TSH+Free T4  -     Cancel: Lipid Panel  -     Cancel: Comprehensive Metabolic Panel    6. Need for shingles vaccine  -     Shingrix Vaccine  -     Comprehensive Metabolic Panel; Future  -     Lipid Panel; Future  -     CBC & Differential; Future  -     Vitamin B12; Future  -     Folate; Future  -     Vitamin D,25-Hydroxy; Future  -     Magnesium; Future  -     Microalbumin / Creatinine Urine Ratio - Urine, Clean Catch; Future  -     Hemoglobin A1c; Future  -     TSH+Free T4; Future  -     T3, Free; Future  -     T3, Free  -     TSH+Free T4  -     Hemoglobin A1c  -     Magnesium  -     Vitamin D,25-Hydroxy  -     Folate  -     Vitamin B12  -     CBC & Differential  -     Lipid Panel  -     Comprehensive Metabolic Panel    7. Need for hepatitis C screening test  -     Hepatitis C Antibody; Future  -     Hepatitis C Antibody  -     Cancel: Hepatitis C Antibody  -     Cancel: CBC & Differential  -     Cancel: Vitamin D,25-Hydroxy  -     Cancel: Magnesium  -     Cancel: Folate  -     Cancel: Vitamin B12  -     Cancel: T3, Free  -     Cancel: TSH+Free T4  -     Cancel: Lipid Panel  -     Cancel: Comprehensive Metabolic Panel    8. Impacted cerumen of right ear    9. Routine history and physical examination of adult  -     Cancel: Hepatitis C Antibody  -     Cancel: CBC & Differential  -     Cancel: Vitamin D,25-Hydroxy  -     Cancel: Magnesium  -     Cancel: Folate  -     Cancel: Vitamin B12  -     Cancel: T3, Free  -     Cancel: TSH+Free T4  -     Cancel: Lipid Panel  -     Cancel: Comprehensive Metabolic Panel    10. Asperger disorder  -     Cancel: Hepatitis C Antibody  -     Cancel: CBC & Differential  -     Cancel: Vitamin D,25-Hydroxy  -     Cancel: Magnesium  -     Cancel:  Folate  -     Cancel: Vitamin B12  -     Cancel: T3, Free  -     Cancel: TSH+Free T4  -     Cancel: Lipid Panel  -     Cancel: Comprehensive Metabolic Panel    11. Need for influenza vaccination  -     Cancel: Hepatitis C Antibody  -     Cancel: CBC & Differential  -     Cancel: Vitamin D,25-Hydroxy  -     Cancel: Magnesium  -     Cancel: Folate  -     Cancel: Vitamin B12  -     Cancel: T3, Free  -     Cancel: TSH+Free T4  -     Cancel: Lipid Panel  -     Cancel: Comprehensive Metabolic Panel           Assessment & Plan  1. Hyperlipidemia.  He is currently on simvastatin half a tablet daily. His LDL was about 90 in November of last year. He is advised to continue his current medication regimen.    2. Depression.  He is currently on Lexapro 10 mg half a tablet daily. He reports that it is helping with his mood. A refill for Lexapro has been provided.    3. Pulmonary Embolism.  He has a history of multiple pulmonary emboli and is status post EKOS mechanical thrombectomy and ultrasound-accelerated thrombolysis in 2024. He has a genetic predisposition for clotting disorders, including positive prothrombin mutation and weakly positive LAC. He is advised to continue lifelong anticoagulation with Eliquis 5 mg twice a day to prevent recurrence of massive PE. A refill for Eliquis has been provided.    4. Prediabetes.  His A1c was 6 in July. He is advised to follow a low-carb diet, reducing intake of bread, rice, and pasta.    5. Cerumen Impaction.  Soft wax was noted in the right ear. He is advised to use Debrox eardrops in the right ear for 7 to 10 days. If the wax does not clear, he can return to the clinic for ear irrigation.    6. Health Maintenance.  He is due for a physical after 11/21/2025. He is advised to get the flu shot in the fall and consider the COVID-19 vaccine based on new recommendations. The shingles vaccine will be administered today. Blood work will be conducted today to assess A1c, thyroid function,  kidney function, vitamin D levels, B12 levels, lipids, and hepatitis C.                Patient Instructions   Use Debrox eardrops to right ear for 7 to 10 days then can return to clinic for ear irrigation    Do labs today    Physical is due after November 21, 2025     Patient or patient representative verbalized consent for the use of Ambient Listening during the visit with  Romy Rueda MD for chart documentation. 7/23/2025  11:59 EDT    FOLLOW UP  Return in about 4 months (around 11/24/2025) for Annual physical.  Check labs    Patient was given instructions and counseling regarding his condition or for health maintenance advice. Please see specific information pulled into the AVS if appropriate.